# Patient Record
Sex: FEMALE | Race: WHITE | NOT HISPANIC OR LATINO | URBAN - METROPOLITAN AREA
[De-identification: names, ages, dates, MRNs, and addresses within clinical notes are randomized per-mention and may not be internally consistent; named-entity substitution may affect disease eponyms.]

---

## 2017-10-16 ENCOUNTER — APPOINTMENT (RX ONLY)
Dept: URBAN - METROPOLITAN AREA CLINIC 169 | Facility: CLINIC | Age: 77
Setting detail: DERMATOLOGY
End: 2017-10-16

## 2017-10-16 DIAGNOSIS — M79.3 PANNICULITIS, UNSPECIFIED: ICD-10-CM

## 2017-10-16 DIAGNOSIS — L90.5 SCAR CONDITIONS AND FIBROSIS OF SKIN: ICD-10-CM

## 2017-10-16 DIAGNOSIS — D18.0 HEMANGIOMA: ICD-10-CM

## 2017-10-16 DIAGNOSIS — L82.0 INFLAMED SEBORRHEIC KERATOSIS: ICD-10-CM

## 2017-10-16 DIAGNOSIS — L44.8 OTHER SPECIFIED PAPULOSQUAMOUS DISORDERS: ICD-10-CM

## 2017-10-16 DIAGNOSIS — L81.4 OTHER MELANIN HYPERPIGMENTATION: ICD-10-CM

## 2017-10-16 DIAGNOSIS — D22 MELANOCYTIC NEVI: ICD-10-CM

## 2017-10-16 DIAGNOSIS — L82.1 OTHER SEBORRHEIC KERATOSIS: ICD-10-CM

## 2017-10-16 DIAGNOSIS — Z85.828 PERSONAL HISTORY OF OTHER MALIGNANT NEOPLASM OF SKIN: ICD-10-CM

## 2017-10-16 PROBLEM — D23.12 OTHER BENIGN NEOPLASM OF SKIN OF LEFT EYELID, INCLUDING CANTHUS: Status: ACTIVE | Noted: 2017-10-16

## 2017-10-16 PROBLEM — D23.11 OTHER BENIGN NEOPLASM OF SKIN OF RIGHT EYELID, INCLUDING CANTHUS: Status: ACTIVE | Noted: 2017-10-16

## 2017-10-16 PROBLEM — D18.01 HEMANGIOMA OF SKIN AND SUBCUTANEOUS TISSUE: Status: ACTIVE | Noted: 2017-10-16

## 2017-10-16 PROBLEM — D22.5 MELANOCYTIC NEVI OF TRUNK: Status: ACTIVE | Noted: 2017-10-16

## 2017-10-16 PROBLEM — D48.5 NEOPLASM OF UNCERTAIN BEHAVIOR OF SKIN: Status: ACTIVE | Noted: 2017-10-16

## 2017-10-16 PROBLEM — L30.9 DERMATITIS, UNSPECIFIED: Status: ACTIVE | Noted: 2017-10-16

## 2017-10-16 PROCEDURE — ? TREATMENT REGIMEN

## 2017-10-16 PROCEDURE — 11101: CPT

## 2017-10-16 PROCEDURE — 99213 OFFICE O/P EST LOW 20 MIN: CPT | Mod: 25

## 2017-10-16 PROCEDURE — ? BIOPSY BY SHAVE METHOD

## 2017-10-16 PROCEDURE — ? OBSERVATION

## 2017-10-16 PROCEDURE — ? COUNSELING

## 2017-10-16 PROCEDURE — ? LIQUID NITROGEN

## 2017-10-16 PROCEDURE — 11100: CPT | Mod: 59

## 2017-10-16 PROCEDURE — ? BIOPSY BY PUNCH METHOD

## 2017-10-16 PROCEDURE — 17110 DESTRUCTION B9 LES UP TO 14: CPT

## 2017-10-16 ASSESSMENT — LOCATION DETAILED DESCRIPTION DERM
LOCATION DETAILED: SUPERIOR MID FOREHEAD
LOCATION DETAILED: RIGHT INFERIOR OCCIPITAL SCALP
LOCATION DETAILED: LEFT SUPERIOR MEDIAL UPPER BACK
LOCATION DETAILED: LEFT MEDIAL TRAPEZIAL NECK
LOCATION DETAILED: LEFT RADIAL DORSAL HAND
LOCATION DETAILED: LEFT MEDIAL BREAST 9-10:00 REGION
LOCATION DETAILED: RIGHT POSTERIOR SHOULDER
LOCATION DETAILED: LEFT ANTERIOR PROXIMAL THIGH
LOCATION DETAILED: RIGHT PROXIMAL CALF
LOCATION DETAILED: RIGHT ANTERIOR PROXIMAL THIGH
LOCATION DETAILED: RIGHT CENTRAL FRONTAL SCALP
LOCATION DETAILED: LEFT PROXIMAL PRETIBIAL REGION
LOCATION DETAILED: LEFT BUTTOCK
LOCATION DETAILED: RIGHT INFERIOR LATERAL UPPER BACK
LOCATION DETAILED: RIGHT RADIAL DORSAL HAND
LOCATION DETAILED: RIGHT ANTERIOR DISTAL THIGH
LOCATION DETAILED: LEFT SUPERIOR MEDIAL LOWER BACK
LOCATION DETAILED: INFERIOR THORACIC SPINE
LOCATION DETAILED: RIGHT LATERAL BREAST 10-11:00 REGION
LOCATION DETAILED: LEFT DISTAL DORSAL FOREARM
LOCATION DETAILED: LEFT SUPERIOR CENTRAL MALAR CHEEK
LOCATION DETAILED: LEFT PROXIMAL CALF
LOCATION DETAILED: RIGHT PROXIMAL PRETIBIAL REGION
LOCATION DETAILED: SUPERIOR THORACIC SPINE
LOCATION DETAILED: RIGHT PROXIMAL MEDIAL CALF
LOCATION DETAILED: RIGHT DISTAL DORSAL FOREARM
LOCATION DETAILED: LEFT SUPERIOR MEDIAL MIDBACK
LOCATION DETAILED: LEFT DISTAL CALF
LOCATION DETAILED: LEFT CLAVICULAR NECK
LOCATION DETAILED: RIGHT INFERIOR LATERAL MIDBACK
LOCATION DETAILED: RIGHT MID-UPPER BACK
LOCATION DETAILED: RIGHT SUPERIOR CENTRAL MALAR CHEEK
LOCATION DETAILED: LEFT ELBOW
LOCATION DETAILED: LEFT SUPERIOR PARIETAL SCALP

## 2017-10-16 ASSESSMENT — LOCATION SIMPLE DESCRIPTION DERM
LOCATION SIMPLE: RIGHT PRETIBIAL REGION
LOCATION SIMPLE: LEFT PRETIBIAL REGION
LOCATION SIMPLE: POSTERIOR SCALP
LOCATION SIMPLE: POSTERIOR NECK
LOCATION SIMPLE: LEFT ELBOW
LOCATION SIMPLE: SCALP
LOCATION SIMPLE: RIGHT THIGH
LOCATION SIMPLE: RIGHT UPPER BACK
LOCATION SIMPLE: LEFT CALF
LOCATION SIMPLE: RIGHT CALF
LOCATION SIMPLE: RIGHT SHOULDER
LOCATION SIMPLE: LEFT UPPER BACK
LOCATION SIMPLE: RIGHT HAND
LOCATION SIMPLE: SUPERIOR FOREHEAD
LOCATION SIMPLE: RIGHT LOWER BACK
LOCATION SIMPLE: RIGHT FOREARM
LOCATION SIMPLE: LEFT BUTTOCK
LOCATION SIMPLE: RIGHT BREAST
LOCATION SIMPLE: UPPER BACK
LOCATION SIMPLE: LEFT FOREARM
LOCATION SIMPLE: LEFT HAND
LOCATION SIMPLE: RIGHT CHEEK
LOCATION SIMPLE: LEFT CHEEK
LOCATION SIMPLE: LEFT ANTERIOR NECK
LOCATION SIMPLE: LEFT LOWER BACK
LOCATION SIMPLE: LEFT THIGH
LOCATION SIMPLE: LEFT BREAST

## 2017-10-16 ASSESSMENT — LOCATION ZONE DERM
LOCATION ZONE: FACE
LOCATION ZONE: LEG
LOCATION ZONE: SCALP
LOCATION ZONE: NECK
LOCATION ZONE: ARM
LOCATION ZONE: HAND
LOCATION ZONE: TRUNK

## 2017-10-16 ASSESSMENT — BSA RASH: BSA RASH: 1

## 2017-10-16 ASSESSMENT — SEVERITY ASSESSMENT: SEVERITY: MILD TO MODERATE

## 2017-10-16 NOTE — PROCEDURE: OBSERVATION
Morphology Per Location (Optional): BCC:EDC:05/11/16
Size Of Lesion In Cm (Optional): 0
Morphology Per Location (Optional): 2000 EXC
Detail Level: Detailed
Body Location Override (Optional - Billing Will Still Be Based On Selected Body Map Location If Applicable): Central upper back
Morphology Per Location (Optional): Craniotomy from stroke 8/2016
Morphology Per Location (Optional): SCC Mohs 1990s

## 2017-10-16 NOTE — PROCEDURE: BIOPSY BY SHAVE METHOD
Bill [id number] For Specimen Handling/Conveyance To Laboratory?: no
Additional Anesthesia Volume In Cc (Will Not Render If 0): 0
Lab: -[id number]
Electrodesiccation Text: The wound bed was treated with electrodesiccation after the biopsy was performed.
Notification Instructions: Patient will be notified of biopsy results. However, patient instructed to call the office if not contacted within 2 weeks.
Dressing: bandage
Body Location Override (Optional - Billing Will Still Be Based On Selected Body Map Location If Applicable): left popliteal
Wound Care: Petrolatum
Silver Nitrate Text: The wound bed was treated with silver nitrate after the biopsy was performed.
Post-Care Instructions: I reviewed with the patient in detail post-care instructions. Patient is to keep the biopsy site dry overnight, and then apply bacitracin twice daily until healed. Patient may apply hydrogen peroxide soaks to remove any crusting.
Detail Level: Detailed
Hemostasis: Aluminum Chloride
Biopsy Type: H and E
Billing Type: Third-Party Bill
Path Notes (To The Dermatopathologist): CC: Amber Penaloza MD( [place] )
Type Of Destruction Used: Curettage
Curettage Text: The wound bed was treated with curettage after the biopsy was performed.
Anesthesia Type: 2% lidocaine with epinephrine
Electrodesiccation And Curettage Text: The wound bed was treated with electrodesiccation and curettage after the biopsy was performed.
Consent: Written consent was obtained and risks were reviewed including but not limited to scarring, infection, bleeding, scabbing, incomplete removal, nerve damage and allergy to anesthesia.
Biopsy Method: Personna blade
Anesthesia Volume In Cc (Will Not Render If 0): 1
Cryotherapy Text: The wound bed was treated with cryotherapy after the biopsy was performed.

## 2017-10-16 NOTE — PROCEDURE: TREATMENT REGIMEN
Detail Level: Detailed
Plan: No sign of DVT:  no swelling, tenderness is not along a vein, no palpable cord.  Pt advised to see PCP however.

## 2017-10-16 NOTE — PROCEDURE: BIOPSY BY PUNCH METHOD
Punch Size In Mm: 6
Hemostasis: None
X Size Of Lesion In Cm (Optional): 0
Home Suture Removal Text: Patient was provided a home suture removal kit and will remove their sutures at home.  If they have any questions or difficulties they will call the office.
Post-Care Instructions: I reviewed with the patient in detail post-care instructions. Patient is to keep the biopsy site dry overnight, and then cleanse daily with soap and water and apply Vaseline daily until healed. Keep covered with bandage the first 48 hours.  Patient may apply hydrogen peroxide soaks to remove any crusting.
Wound Care: Vaseline
Bill [id number] For Specimen Handling/Conveyance To Laboratory?: no
Dressing: bandage
Billing Type: Third-Party Bill
Lab Facility: [id number]
Anesthesia Volume In Cc (Will Not Render If 0): 1
Detail Level: Simple
Epidermal Sutures: 5-0 Nylon
Consent: Written consent was obtained and risks were reviewed including but not limited to scarring, infection, bleeding, scabbing, incomplete removal, nerve damage and allergy to anesthesia.
Body Location Override (Optional - Billing Will Still Be Based On Selected Body Map Location If Applicable): right medial calf
Biopsy Type: H and E
Suture Removal: 14 days
Notification Instructions: Patient will be notified of biopsy results. However, patient instructed to call the office if not contacted within 2 weeks.
Anesthesia Type: 2% lidocaine with epinephrine
Render Post-Care Instructions In Note?: yes
Lab: -[id number]

## 2017-10-16 NOTE — HPI: EVALUATION OF SKIN LESION(S)
Have Your Spot(S) Been Treated In The Past?: has not been treated
Hpi Title: Evaluation of Skin Lesions
Additional History: No prior history, no recent illness, alcohol 2 oz per week, no trauma to area.

## 2017-10-24 ENCOUNTER — APPOINTMENT (RX ONLY)
Dept: URBAN - METROPOLITAN AREA CLINIC 169 | Facility: CLINIC | Age: 77
Setting detail: DERMATOLOGY
End: 2017-10-24

## 2017-10-30 ENCOUNTER — APPOINTMENT (RX ONLY)
Dept: URBAN - METROPOLITAN AREA CLINIC 169 | Facility: CLINIC | Age: 77
Setting detail: DERMATOLOGY
End: 2017-10-30

## 2017-10-30 DIAGNOSIS — M31.8 OTHER SPECIFIED NECROTIZING VASCULOPATHIES: ICD-10-CM

## 2017-10-30 DIAGNOSIS — Z48.02 ENCOUNTER FOR REMOVAL OF SUTURES: ICD-10-CM

## 2017-10-30 PROBLEM — L30.9 DERMATITIS, UNSPECIFIED: Status: ACTIVE | Noted: 2017-10-30

## 2017-10-30 PROCEDURE — ? SUTURE REMOVAL (GLOBAL PERIOD)

## 2017-10-30 PROCEDURE — ? TREATMENT REGIMEN

## 2017-10-30 PROCEDURE — ? COUNSELING

## 2017-10-30 PROCEDURE — 99214 OFFICE O/P EST MOD 30 MIN: CPT

## 2017-10-30 PROCEDURE — ? REFERRAL CORRESPONDENCE

## 2017-10-30 PROCEDURE — ? ORDER TESTS

## 2017-10-30 ASSESSMENT — SEVERITY ASSESSMENT: SEVERITY: MODERATE

## 2017-10-30 ASSESSMENT — LOCATION ZONE DERM: LOCATION ZONE: LEG

## 2017-10-30 ASSESSMENT — LOCATION SIMPLE DESCRIPTION DERM
LOCATION SIMPLE: RIGHT PRETIBIAL REGION
LOCATION SIMPLE: RIGHT CALF

## 2017-10-30 ASSESSMENT — LOCATION DETAILED DESCRIPTION DERM
LOCATION DETAILED: RIGHT PROXIMAL MEDIAL CALF
LOCATION DETAILED: RIGHT MEDIAL PROXIMAL PRETIBIAL REGION

## 2017-10-30 NOTE — PROCEDURE: TREATMENT REGIMEN
Initiate Treatment: No specific treatment.  Pt unable to take ASA due to her history.
Plan: Offered to take another bx.  Ordered labwork.  We added PTT and anti-beta-2 glycoprotein I to the lab slip. I could not find them in the database of tests to order. We wrote them in at the bottom.  We also added KATELYN.  Patient will show these to her PCP to see if she agrees that testing this is reasonable.\\nWclif researched Up-to-Date and a Google search.
Detail Level: Detailed

## 2017-10-30 NOTE — PROCEDURE: SUTURE REMOVAL (GLOBAL PERIOD)
Add 42377 Cpt? (Important Note: In 2017 The Use Of 32268 Is Being Tracked By Cms To Determine Future Global Period Reimbursement For Global Periods): no
Detail Level: Detailed

## 2018-12-19 ENCOUNTER — APPOINTMENT (OUTPATIENT)
Age: 78
Setting detail: DERMATOLOGY
End: 2018-12-24

## 2018-12-19 DIAGNOSIS — L57.8 OTHER SKIN CHANGES DUE TO CHRONIC EXPOSURE TO NONIONIZING RADIATION: ICD-10-CM

## 2018-12-19 DIAGNOSIS — L82.1 OTHER SEBORRHEIC KERATOSIS: ICD-10-CM

## 2018-12-19 DIAGNOSIS — Z71.89 OTHER SPECIFIED COUNSELING: ICD-10-CM

## 2018-12-19 DIAGNOSIS — L82.0 INFLAMED SEBORRHEIC KERATOSIS: ICD-10-CM

## 2018-12-19 DIAGNOSIS — D18.0 HEMANGIOMA: ICD-10-CM

## 2018-12-19 PROBLEM — D23.121 OTHER BENIGN NEOPLASM OF SKIN OF LEFT UPPER EYELID, INCLUDING CANTHUS: Status: ACTIVE | Noted: 2018-12-19

## 2018-12-19 PROBLEM — D23.111 OTHER BENIGN NEOPLASM OF SKIN OF RIGHT UPPER EYELID, INCLUDING CANTHUS: Status: ACTIVE | Noted: 2018-12-19

## 2018-12-19 PROBLEM — D18.01 HEMANGIOMA OF SKIN AND SUBCUTANEOUS TISSUE: Status: ACTIVE | Noted: 2018-12-19

## 2018-12-19 PROCEDURE — OTHER COUNSELING: OTHER

## 2018-12-19 PROCEDURE — OTHER DEFER: OTHER

## 2018-12-19 PROCEDURE — OTHER LIQUID NITROGEN: OTHER

## 2018-12-19 PROCEDURE — 17110 DESTRUCT B9 LESION 1-14: CPT

## 2018-12-19 PROCEDURE — 99203 OFFICE O/P NEW LOW 30 MIN: CPT | Mod: 25

## 2018-12-19 PROCEDURE — OTHER MIPS QUALITY: OTHER

## 2018-12-19 ASSESSMENT — LOCATION DETAILED DESCRIPTION DERM
LOCATION DETAILED: LEFT MEDIAL BREAST 9-10:00 REGION
LOCATION DETAILED: LEFT RIB CAGE
LOCATION DETAILED: LEFT LATERAL TRAPEZIAL NECK
LOCATION DETAILED: LEFT PROXIMAL POSTERIOR UPPER ARM
LOCATION DETAILED: RIGHT DISTAL DORSAL FOREARM
LOCATION DETAILED: RIGHT DISTAL POSTERIOR UPPER ARM
LOCATION DETAILED: RIGHT RIB CAGE
LOCATION DETAILED: SUPERIOR THORACIC SPINE
LOCATION DETAILED: SUPERIOR LUMBAR SPINE
LOCATION DETAILED: INFERIOR MID FOREHEAD
LOCATION DETAILED: LEFT SUPERIOR LATERAL MIDBACK
LOCATION DETAILED: RIGHT PROXIMAL POSTERIOR UPPER ARM
LOCATION DETAILED: LEFT LATERAL ABDOMEN
LOCATION DETAILED: LEFT PROXIMAL RADIAL DORSAL FOREARM
LOCATION DETAILED: UPPER STERNUM
LOCATION DETAILED: LEFT DISTAL POSTERIOR UPPER ARM

## 2018-12-19 ASSESSMENT — LOCATION ZONE DERM
LOCATION ZONE: FACE
LOCATION ZONE: NECK
LOCATION ZONE: TRUNK
LOCATION ZONE: ARM

## 2018-12-19 ASSESSMENT — LOCATION SIMPLE DESCRIPTION DERM
LOCATION SIMPLE: ABDOMEN
LOCATION SIMPLE: UPPER BACK
LOCATION SIMPLE: INFERIOR FOREHEAD
LOCATION SIMPLE: POSTERIOR NECK
LOCATION SIMPLE: LEFT LOWER BACK
LOCATION SIMPLE: LEFT POSTERIOR UPPER ARM
LOCATION SIMPLE: LEFT BREAST
LOCATION SIMPLE: LOWER BACK
LOCATION SIMPLE: RIGHT FOREARM
LOCATION SIMPLE: LEFT FOREARM
LOCATION SIMPLE: CHEST
LOCATION SIMPLE: RIGHT POSTERIOR UPPER ARM

## 2018-12-19 NOTE — PROCEDURE: LIQUID NITROGEN
Consent: The patient's consent was obtained including but not limited to risks of crusting, scabbing, blistering, scarring, darker or lighter pigmentary change, recurrence, incomplete removal and infection.
Post-Care Instructions: I reviewed with the patient in detail post-care instructions. Patient is to wear sunprotection, and avoid picking at any of the treated lesions. Pt may apply Vaseline to crusted or scabbing areas.
Number Of Freeze-Thaw Cycles: 2 freeze-thaw cycles
Add 52 Modifier (Optional): no
Render Post-Care Instructions In Note?: yes
Duration Of Freeze Thaw-Cycle (Seconds): 5-10
Detail Level: Simple
Medical Necessity Information: It is in your best interest to select a reason for this procedure from the list below. All of these items fulfill various CMS LCD requirements except the new and changing color options.
Medical Necessity Clause: This procedure was medically necessary because the lesions that were treated were: irritated, itchy

## 2019-01-08 ENCOUNTER — APPOINTMENT (OUTPATIENT)
Age: 79
Setting detail: DERMATOLOGY
End: 2019-01-13

## 2019-01-08 PROBLEM — D23.112 OTHER BENIGN NEOPLASM OF SKIN OF RIGHT LOWER EYELID, INCLUDING CANTHUS: Status: ACTIVE | Noted: 2019-01-08

## 2019-01-08 PROBLEM — D23.121 OTHER BENIGN NEOPLASM OF SKIN OF LEFT UPPER EYELID, INCLUDING CANTHUS: Status: ACTIVE | Noted: 2019-01-08

## 2019-01-08 PROBLEM — D23.111 OTHER BENIGN NEOPLASM OF SKIN OF RIGHT UPPER EYELID, INCLUDING CANTHUS: Status: ACTIVE | Noted: 2019-01-08

## 2019-01-08 PROCEDURE — OTHER COUNSELING: OTHER

## 2019-01-08 PROCEDURE — OTHER CONSULTATION EXCISION: OTHER

## 2019-01-08 PROCEDURE — 99203 OFFICE O/P NEW LOW 30 MIN: CPT

## 2019-01-08 PROCEDURE — OTHER OTHER: OTHER

## 2019-01-08 NOTE — PROCEDURE: OTHER
Other (Free Text): Lengthy discussion with patient regarding surgical excision with biopsy to ensure no malignant pathology. \\nShe refused in office biopsy due to anxiety about procedures around her eyes. I offered to perform excisional biopsies under anesthesia. However, she refused this as well. I discussed importance of frequent follow up to re-evaluate lesions.\\nShe states that she is needing cataracts surgery in the future. I recommended that she discuss removal of the lesions at the same time as her cataracts with her ophthalmologist.
Detail Level: Detailed
Note Text (......Xxx Chief Complaint.): This diagnosis correlates with the

## 2022-08-23 NOTE — PROCEDURE: MIPS QUALITY
Quality 111:Pneumonia Vaccination Status For Older Adults: Pneumococcal Vaccination Previously Received
[FreeTextEntry2] : left ankle 
Detail Level: Zone
Quality 110: Preventive Care And Screening: Influenza Immunization: Influenza Immunization Administered during Influenza season
Quality 402: Tobacco Use And Help With Quitting Among Adolescents: Patient screened for tobacco and never smoked

## 2024-01-04 ENCOUNTER — APPOINTMENT (RX ONLY)
Dept: URBAN - METROPOLITAN AREA CLINIC 167 | Facility: CLINIC | Age: 84
Setting detail: DERMATOLOGY
End: 2024-01-04

## 2024-01-04 DIAGNOSIS — L57.8 OTHER SKIN CHANGES DUE TO CHRONIC EXPOSURE TO NONIONIZING RADIATION: ICD-10-CM

## 2024-01-04 DIAGNOSIS — Z85.828 PERSONAL HISTORY OF OTHER MALIGNANT NEOPLASM OF SKIN: ICD-10-CM

## 2024-01-04 DIAGNOSIS — D22 MELANOCYTIC NEVI: ICD-10-CM

## 2024-01-04 DIAGNOSIS — L82.0 INFLAMED SEBORRHEIC KERATOSIS: ICD-10-CM

## 2024-01-04 DIAGNOSIS — L82.1 OTHER SEBORRHEIC KERATOSIS: ICD-10-CM

## 2024-01-04 DIAGNOSIS — D18.0 HEMANGIOMA: ICD-10-CM

## 2024-01-04 DIAGNOSIS — Z71.89 OTHER SPECIFIED COUNSELING: ICD-10-CM

## 2024-01-04 DIAGNOSIS — L81.4 OTHER MELANIN HYPERPIGMENTATION: ICD-10-CM

## 2024-01-04 PROBLEM — D18.01 HEMANGIOMA OF SKIN AND SUBCUTANEOUS TISSUE: Status: ACTIVE | Noted: 2024-01-04

## 2024-01-04 PROBLEM — D23.39 OTHER BENIGN NEOPLASM OF SKIN OF OTHER PARTS OF FACE: Status: ACTIVE | Noted: 2024-01-04

## 2024-01-04 PROBLEM — D22.5 MELANOCYTIC NEVI OF TRUNK: Status: ACTIVE | Noted: 2024-01-04

## 2024-01-04 PROBLEM — D23.121 OTHER BENIGN NEOPLASM OF SKIN OF LEFT UPPER EYELID, INCLUDING CANTHUS: Status: ACTIVE | Noted: 2024-01-04

## 2024-01-04 PROCEDURE — 99203 OFFICE O/P NEW LOW 30 MIN: CPT | Mod: 25

## 2024-01-04 PROCEDURE — 17110 DESTRUCTION B9 LES UP TO 14: CPT

## 2024-01-04 PROCEDURE — ? TREATMENT REGIMEN

## 2024-01-04 PROCEDURE — ? LIQUID NITROGEN

## 2024-01-04 PROCEDURE — ? COUNSELING

## 2024-01-04 ASSESSMENT — LOCATION ZONE DERM
LOCATION ZONE: FACE
LOCATION ZONE: NECK
LOCATION ZONE: TRUNK

## 2024-01-04 ASSESSMENT — LOCATION DETAILED DESCRIPTION DERM
LOCATION DETAILED: RIGHT MEDIAL UPPER BACK
LOCATION DETAILED: RIGHT CENTRAL MALAR CHEEK
LOCATION DETAILED: RIGHT SUPERIOR MEDIAL UPPER BACK
LOCATION DETAILED: SUPERIOR THORACIC SPINE
LOCATION DETAILED: RIGHT INFERIOR MEDIAL UPPER BACK
LOCATION DETAILED: LEFT LATERAL TRAPEZIAL NECK

## 2024-01-04 ASSESSMENT — LOCATION SIMPLE DESCRIPTION DERM
LOCATION SIMPLE: RIGHT UPPER BACK
LOCATION SIMPLE: RIGHT CHEEK
LOCATION SIMPLE: UPPER BACK
LOCATION SIMPLE: POSTERIOR NECK

## 2024-01-04 NOTE — PROCEDURE: LIQUID NITROGEN
Detail Level: Detailed
Render Post-Care Instructions In Note?: no
Medical Necessity Information: It is in your best interest to select a reason for this procedure from the list below. All of these items fulfill various CMS LCD requirements except the new and changing color options.
Spray Paint Text: The liquid nitrogen was applied to the skin utilizing a spray paint frosting technique.
Medical Necessity Clause: This procedure was medically necessary because the lesions that were treated were:
Consent: The patient's consent was obtained including but not limited to risks of crusting, scabbing, blistering, scarring, darker or lighter pigmentary change, recurrence, incomplete removal and infection.
Show Topical Anesthesia Variable?: Yes
Post-Care Instructions: I reviewed with the patient in detail post-care instructions. Patient is to wear sunprotection, and avoid picking at any of the treated lesions. Pt may apply Vaseline to crusted or scabbing areas.

## 2024-09-24 ENCOUNTER — APPOINTMENT (RX ONLY)
Dept: URBAN - METROPOLITAN AREA CLINIC 167 | Facility: CLINIC | Age: 84
Setting detail: DERMATOLOGY
End: 2024-09-24

## 2024-09-24 DIAGNOSIS — D22 MELANOCYTIC NEVI: ICD-10-CM

## 2024-09-24 DIAGNOSIS — D18.0 HEMANGIOMA: ICD-10-CM

## 2024-09-24 DIAGNOSIS — Z71.89 OTHER SPECIFIED COUNSELING: ICD-10-CM

## 2024-09-24 DIAGNOSIS — L82.1 OTHER SEBORRHEIC KERATOSIS: ICD-10-CM

## 2024-09-24 PROBLEM — D18.01 HEMANGIOMA OF SKIN AND SUBCUTANEOUS TISSUE: Status: ACTIVE | Noted: 2024-09-24

## 2024-09-24 PROBLEM — D22.5 MELANOCYTIC NEVI OF TRUNK: Status: ACTIVE | Noted: 2024-09-24

## 2024-09-24 PROCEDURE — 99213 OFFICE O/P EST LOW 20 MIN: CPT

## 2024-09-24 PROCEDURE — ? LIQUID NITROGEN (COSMETIC)

## 2024-09-24 PROCEDURE — ? COUNSELING

## 2024-09-24 ASSESSMENT — LOCATION DETAILED DESCRIPTION DERM
LOCATION DETAILED: RIGHT INFERIOR CENTRAL MALAR CHEEK
LOCATION DETAILED: LEFT MID TEMPLE
LOCATION DETAILED: LEFT INFERIOR LATERAL FOREHEAD
LOCATION DETAILED: RIGHT FOREHEAD
LOCATION DETAILED: LEFT INFERIOR CENTRAL MALAR CHEEK
LOCATION DETAILED: LEFT CENTRAL BUCCAL CHEEK
LOCATION DETAILED: RIGHT INFERIOR FOREHEAD
LOCATION DETAILED: LEFT SUPERIOR LATERAL MALAR CHEEK
LOCATION DETAILED: RIGHT SUPERIOR CENTRAL MALAR CHEEK
LOCATION DETAILED: SUPERIOR MID FOREHEAD
LOCATION DETAILED: LEFT LATERAL FOREHEAD
LOCATION DETAILED: LEFT CENTRAL MALAR CHEEK
LOCATION DETAILED: RIGHT LATERAL EYEBROW
LOCATION DETAILED: SUPERIOR THORACIC SPINE

## 2024-09-24 ASSESSMENT — LOCATION SIMPLE DESCRIPTION DERM
LOCATION SIMPLE: RIGHT FOREHEAD
LOCATION SIMPLE: LEFT TEMPLE
LOCATION SIMPLE: RIGHT EYEBROW
LOCATION SIMPLE: UPPER BACK
LOCATION SIMPLE: LEFT FOREHEAD
LOCATION SIMPLE: SUPERIOR FOREHEAD
LOCATION SIMPLE: LEFT CHEEK
LOCATION SIMPLE: RIGHT CHEEK

## 2024-09-24 ASSESSMENT — LOCATION ZONE DERM
LOCATION ZONE: FACE
LOCATION ZONE: TRUNK

## 2024-09-24 NOTE — PROCEDURE: LIQUID NITROGEN (COSMETIC)
Post-Care Instructions: I reviewed with the patient in detail post-care instructions. Patient is to wear sunprotection, and avoid picking at any of the treated lesions. Pt may apply Vaseline to crusted or scabbing areas.
Show Spray Paint Technique Variable?: Yes
Spray Paint Technique: No
Billing Information: Bill by Static Price
Detail Level: Detailed
Consent: The patient's consent was obtained including but not limited to risks of crusting, scabbing, blistering, scarring, darker or lighter pigmentary change, recurrence, incomplete removal and infection. The patient understands that the procedure is cosmetic in nature and is not covered by insurance.
Spray Paint Text: The liquid nitrogen was applied to the skin utilizing a spray paint frosting technique.
Price (Use Numbers Only, No Special Characters Or $): 200

## 2024-09-24 NOTE — HPI: HISTORY OF BASAL CELL CARCINOMA
What Is The Reason For Today's Visit?: History of Basal Cell Carcinoma
How Many Bccs Have You Had?: more than one
When Was Your Last Cancer Diagnosed?: 2016
Additional History: The patient reports a spot on her left forehead and others across her upper body.